# Patient Record
Sex: FEMALE | Race: WHITE
[De-identification: names, ages, dates, MRNs, and addresses within clinical notes are randomized per-mention and may not be internally consistent; named-entity substitution may affect disease eponyms.]

---

## 2019-03-25 ENCOUNTER — APPOINTMENT (OUTPATIENT)
Dept: OTOLARYNGOLOGY | Facility: CLINIC | Age: 34
End: 2019-03-25
Payer: COMMERCIAL

## 2019-03-25 DIAGNOSIS — Z78.9 OTHER SPECIFIED HEALTH STATUS: ICD-10-CM

## 2019-03-25 PROBLEM — Z00.00 ENCOUNTER FOR PREVENTIVE HEALTH EXAMINATION: Status: ACTIVE | Noted: 2019-03-25

## 2019-03-25 PROCEDURE — 69210 REMOVE IMPACTED EAR WAX UNI: CPT

## 2019-03-25 PROCEDURE — 99203 OFFICE O/P NEW LOW 30 MIN: CPT | Mod: 25

## 2019-03-25 RX ORDER — CIPROFLOXACIN AND DEXAMETHASONE 3; 1 MG/ML; MG/ML
0.3-0.1 SUSPENSION/ DROPS AURICULAR (OTIC) TWICE DAILY
Qty: 1 | Refills: 0 | Status: ACTIVE | COMMUNITY
Start: 2019-03-25 | End: 1900-01-01

## 2019-03-25 NOTE — PHYSICAL EXAM
[de-identified] : purulent discharge [de-identified] : thickened TM  [Midline] : trachea located in midline position [Normal] : no rashes

## 2019-03-25 NOTE — HISTORY OF PRESENT ILLNESS
[de-identified] : Patient here today c/o clogged left ear. Patient has been feeling this for 2 weeks. No otalgia. Hearing loss on the left. Symptoms began while she was chewing and gradually worsened.  Patient has tried prescribed ear drops and Augmentin without relief. Denies otorrhea. Notes discomfort in the left ear. Notes pressure like sensation on the left. Constant in nature. Denies dizziness. Denies audiogram.

## 2019-03-29 ENCOUNTER — APPOINTMENT (OUTPATIENT)
Dept: OTOLARYNGOLOGY | Facility: CLINIC | Age: 34
End: 2019-03-29
Payer: COMMERCIAL

## 2019-03-29 VITALS
HEART RATE: 85 BPM | DIASTOLIC BLOOD PRESSURE: 82 MMHG | SYSTOLIC BLOOD PRESSURE: 136 MMHG | WEIGHT: 125 LBS | HEIGHT: 62 IN | OXYGEN SATURATION: 99 % | BODY MASS INDEX: 23 KG/M2

## 2019-03-29 DIAGNOSIS — H60.509 UNSPECIFIED ACUTE NONINFECTIVE OTITIS EXTERNA, UNSPECIFIED EAR: ICD-10-CM

## 2019-03-29 PROCEDURE — 99212 OFFICE O/P EST SF 10 MIN: CPT

## 2019-03-29 NOTE — ASSESSMENT
[FreeTextEntry1] : Pt will continue drops for two more days and follow up with any additional new symptoms.

## 2019-03-29 NOTE — HISTORY OF PRESENT ILLNESS
[de-identified] : Pt. is here for follow up for otitis externa. Pt is much improved on the drops. She has no complaints of pain or hearing loss.